# Patient Record
Sex: FEMALE | Race: WHITE
[De-identification: names, ages, dates, MRNs, and addresses within clinical notes are randomized per-mention and may not be internally consistent; named-entity substitution may affect disease eponyms.]

---

## 2018-02-12 ENCOUNTER — HOSPITAL ENCOUNTER (OUTPATIENT)
Dept: HOSPITAL 75 - RAD | Age: 68
End: 2018-02-12
Attending: ORTHOPAEDIC SURGERY
Payer: MEDICARE

## 2018-02-12 DIAGNOSIS — M48.02: Primary | ICD-10-CM

## 2018-02-12 DIAGNOSIS — M47.812: ICD-10-CM

## 2018-02-12 PROCEDURE — 72125 CT NECK SPINE W/O DYE: CPT

## 2018-02-12 NOTE — DIAGNOSTIC IMAGING REPORT
CLINICAL INDICATION: Postop fusion. Six-month followup.



EXAM: Axial CT scan of the cervical spine performed without IV

contrast. Sagittal and coronal reformatted images were created.



COMPARISON: CT scan of cervical spine dated 10/01/2013.



FINDINGS:

There is interval postop changes to the cervical spine seen with

placement of C3 through C6 anterior cervical interbody fusion

with intervertebral graft material hardware seen. There is no

evidence of osteolysis or hardware fracture. There is note of

intervertebral bone material seen in the C3-C4 and C4-C5

intervertebral regions. There is bone development in the C5-C6

level, but there is note of a lucency across it. This area should

be closely followed on subsequent imaging. The previously seen

C6-C7 anterior cervical disc fusion hardware has been removed,

and there is interval solid bony fusion/bridging at the C6-C7

level.



There is no significant paraspinal soft tissue abnormality.



Cervical spine normal alignment with no acute fracture or

dislocation. There is cervical spine degenerative disease again

seen.



C1-C2: There is stable small spurring at the atlantoodontoid

interval anteriorly.



C2-C3: Unremarkable.



C3-C4: There is small bilateral uncinate spurs which is not

significantly changed. There is relatively stable moderate to

severe right neural foramen narrowing and mild to moderate left

neural foramen narrowing.



C4-C5: Again seen small bilateral uncinate spurs which is not

significantly changed. There is mild to moderate left neural

foramen narrowing. Otherwise remainder this level is

unremarkable.



C5-C6: Stable small posterior vertebral body spurs and small

bilateral uncinate spurs. There is no major central canal

narrowing. There is no significant right neural foramen

narrowing. There is stable at least moderate left neural foramen

narrowing.



C6-C7: There is slight progression of minimal posterior vertebral

spurs which cause no significant central canal narrowing. There

is no significant neural foramen narrowing.



C7-T1: Unremarkable.



IMPRESSION:

1: There are interval postop changes to the cervical spine with

interval placement of C3 through C6 anterior cervical interbody

fusion with no hardware complications. There is incomplete bony

bridging/fusion seen at these levels, but there is intervertebral

calcification noted. These areas should continue to be followed

on subsequent imaging.



2: There is interval removal of the previously seen C6-C7

anterior cervical fusion hardware with solid intervertebral bony

fusion/bridging seen.



3: Otherwise, there are stable cervical spine degenerative

changes, as described above.



Dictated by: 



  Dictated on workstation # [id number]

## 2018-08-13 ENCOUNTER — HOSPITAL ENCOUNTER (OUTPATIENT)
Dept: HOSPITAL 75 - RAD | Age: 68
End: 2018-08-13
Attending: ORTHOPAEDIC SURGERY
Payer: MEDICARE

## 2018-08-13 DIAGNOSIS — Z98.890: ICD-10-CM

## 2018-08-13 DIAGNOSIS — M54.2: Primary | ICD-10-CM

## 2018-08-13 PROCEDURE — 72125 CT NECK SPINE W/O DYE: CPT

## 2018-08-13 NOTE — DIAGNOSTIC IMAGING REPORT
PROCEDURE: CT cervical spine without contrast.



TECHNIQUE: Multiple contiguous axial images were obtained through

the cervical spine without the use of intravenous contrast.

Sagittal and coronal reformations were then performed.



INDICATION: Cervical spine surgery. The study is performed for

followup.



COMPARISON: Correlation is made with prior CT from 02/12/2018.



FINDINGS: Curvature and alignment of the cervical spine is

normal. Postop changes of ACDF extending from C3 through C6 is

again noted. The hardware appears intact. No fracture or

loosening is identified. Bony structures are intact. No acute

fracture is detected. There are inter-vertebral devices present

at the C3-C4, C4-C5 and C5-C6 levels. There appears to be osseous

fusion between the C6 and C7 vertebral bodies. Old screw tracts

within the C7 vertebral body are seen. The prevertebral tissues

are within normal limits. Odontoid is intact. The facets are

unremarkable.



IMPRESSION: Stable postop changes of C3 through C6 ACDF when

compared with prior exam from 02/12/2018. No complicating

features are identified. No definite evidence of hardware

fracture or loosening is identified.



Dictated by: 



  Dictated on workstation # [id number]